# Patient Record
Sex: MALE | URBAN - METROPOLITAN AREA
[De-identification: names, ages, dates, MRNs, and addresses within clinical notes are randomized per-mention and may not be internally consistent; named-entity substitution may affect disease eponyms.]

---

## 2022-10-25 ENCOUNTER — APPOINTMENT (OUTPATIENT)
Dept: URBAN - METROPOLITAN AREA CLINIC 188 | Age: 67
Setting detail: DERMATOLOGY
End: 2022-10-25

## 2022-11-14 ENCOUNTER — APPOINTMENT (OUTPATIENT)
Dept: URBAN - METROPOLITAN AREA SURGERY 9 | Age: 67
Setting detail: DERMATOLOGY
End: 2022-11-15

## 2022-11-14 PROBLEM — C44.41 BASAL CELL CARCINOMA OF SKIN OF SCALP AND NECK: Status: ACTIVE | Noted: 2022-11-14

## 2022-11-14 PROCEDURE — OTHER EXCISION: OTHER

## 2022-11-14 PROCEDURE — 12042 INTMD RPR N-HF/GENIT2.6-7.5: CPT

## 2022-11-14 PROCEDURE — OTHER CONSULTATION EXCISION: OTHER

## 2022-11-14 PROCEDURE — OTHER RETURN TO REFERRING PROVIDER: OTHER

## 2022-11-14 PROCEDURE — 11622 EXC S/N/H/F/G MAL+MRG 1.1-2: CPT

## 2022-11-14 NOTE — PROCEDURE: EXCISION
04/28/19 1015   Group 1   Start Time 0930   Stop Time 1015   Length (min) 45 Min   Group Name check in    Attendance Not present   ABHISHEK Menendez     Scalpel Size: 15 blade

## 2022-11-14 NOTE — PROCEDURE: CONSULTATION EXCISION
Detail Level: Detailed
Anatomic Location From Referring Provider: right neck
X Size Of Lesion In Cm (Optional): 0

## 2022-11-14 NOTE — PROCEDURE: EXCISION
Focalin IR 5mg Approved    Prior Authorization Number: [id number]  Dates of approval: Thru 03/22/2022  Filling Pharmacy: [facility]  Additional Information: Pharmacy contacted and notified of this approval. V-Y Flap Text: The defect edges were debeveled with a #15 scalpel blade.  Given the location of the defect, shape of the defect and the proximity to free margins a V-Y flap was deemed most appropriate.  Using a sterile surgical marker, an appropriate advancement flap was drawn incorporating the defect and placing the expected incisions within the relaxed skin tension lines where possible.    The area thus outlined was incised deep to adipose tissue with a #15 scalpel blade.  The skin margins were undermined to an appropriate distance in all directions utilizing iris scissors.

## 2022-11-14 NOTE — PROCEDURE: EXCISION
Left message for patient to call and scheduled consult appointment with Dr. Hill in 3-4 weeks         Adjacent Tissue Transfer Text: The defect edges were debeveled with a #15 scalpel blade.  Given the location of the defect and the proximity to free margins an adjacent tissue transfer was deemed most appropriate.  Using a sterile surgical marker, an appropriate flap was drawn incorporating the defect and placing the expected incisions within the relaxed skin tension lines where possible.    The area thus outlined was incised deep to adipose tissue with a #15 scalpel blade.  The skin margins were undermined to an appropriate distance in all directions utilizing iris scissors.

## 2023-10-31 ENCOUNTER — APPOINTMENT (OUTPATIENT)
Dept: URBAN - METROPOLITAN AREA CLINIC 188 | Age: 68
Setting detail: DERMATOLOGY
End: 2023-10-31

## 2024-10-31 ENCOUNTER — APPOINTMENT (OUTPATIENT)
Dept: URBAN - METROPOLITAN AREA CLINIC 188 | Age: 69
Setting detail: DERMATOLOGY
End: 2024-10-31